# Patient Record
Sex: MALE | Race: WHITE | URBAN - METROPOLITAN AREA
[De-identification: names, ages, dates, MRNs, and addresses within clinical notes are randomized per-mention and may not be internally consistent; named-entity substitution may affect disease eponyms.]

---

## 2022-02-10 ENCOUNTER — APPOINTMENT (RX ONLY)
Dept: URBAN - METROPOLITAN AREA CLINIC 160 | Facility: CLINIC | Age: 52
Setting detail: DERMATOLOGY
End: 2022-02-10

## 2022-02-10 PROBLEM — D48.5 NEOPLASM OF UNCERTAIN BEHAVIOR OF SKIN: Status: ACTIVE | Noted: 2022-02-10

## 2022-02-10 PROCEDURE — ? SCALLOP BIOPSY

## 2022-02-10 PROCEDURE — 11102 TANGNTL BX SKIN SINGLE LES: CPT

## 2022-02-10 PROCEDURE — ? ADDITIONAL NOTES

## 2022-02-10 NOTE — PROCEDURE: SCALLOP BIOPSY
Detail Level: Detailed
Depth Of Biopsy: dermis
Size Of Lesion In Cm (Optional): 0
Biopsy Type: H and E
Biopsy Method: Dermablade
Anesthesia Type: 1% lidocaine with epinephrine
Anesthesia Volume In Cc (Will Not Render If 0): 0.5
Hemostasis: Electrocautery
Wound Care: Petrolatum
Lab: 6
Lab Facility: [id number]
Render Path Notes In Note?: No
Consent: Written consent was obtained and risks were reviewed including but not limited to scarring, infection, bleeding, scabbing, incomplete removal, nerve damage and allergy to anesthesia.
Post-Care Instructions: I reviewed with the patient in detail post-care instructions. Patient is to keep the biopsy site dry overnight, and then apply bacitracin twice daily until healed. Patient may apply hydrogen peroxide soaks to remove any crusting.
Notification Instructions: Patient will be notified of biopsy results. However, patient instructed to call the office if not contacted within 2 weeks.
Billing Type: Third-Party Bill

## 2022-03-01 ENCOUNTER — APPOINTMENT (RX ONLY)
Dept: URBAN - METROPOLITAN AREA CLINIC 160 | Facility: CLINIC | Age: 52
Setting detail: DERMATOLOGY
End: 2022-03-01

## 2022-03-01 PROBLEM — C43.39 MALIGNANT MELANOMA OF OTHER PARTS OF FACE: Status: ACTIVE | Noted: 2022-03-01

## 2022-03-01 PROCEDURE — ? MELANOMA STAGING

## 2022-03-01 PROCEDURE — ? COUNSELING

## 2022-03-01 PROCEDURE — ? PATHOLOGY DISCUSSION

## 2022-03-01 PROCEDURE — ? ADDITIONAL NOTES

## 2022-03-01 PROCEDURE — ? FULL BODY SKIN EXAM - DECLINED

## 2022-03-01 PROCEDURE — 99024 POSTOP FOLLOW-UP VISIT: CPT

## 2022-03-01 PROCEDURE — ? CONSULTATION EXCISION

## 2022-03-01 NOTE — PROCEDURE: CONSULTATION EXCISION
X Size Of Lesion In Cm (Optional): 0
When Should The Patient Follow-Up For Their Next Full-Body Skin Exam?: 3 Months
Anatomic Location From Referring Provider (Optional- Will Override The Chosen Location): left superior lateral malar cheek
Detail Level: Detailed

## 2022-03-01 NOTE — PROCEDURE: MELANOMA STAGING
Detail Level: Detailed
Counseling For Stage Iiid Melanomas: I reviewed the factors which determine melanoma stage. For Stage IIID the 5-year survival rate is around 32%. The 10-year survival is around 24%. I recommended at least monthly skin self-examinations and close dermatology follow-up.
Counseling For Stage Iiib Melanomas: I reviewed the factors which determine melanoma stage. For Stage IIIB the 5-year survival rate is around 59%. The 10-year survival is around 43%. I recommended at least monthly skin self-examinations and close dermatology follow-up.
Counseling For Stage 0 Melanomas: I reviewed the factors which determine melanoma stage. For Stage 0 the 5-year survival rate is 100%. I recommended at least monthly skin self-examinations and close dermatology follow-up.
Distant Metastases: No distant metastases
Counseling For Stage Iic Melanomas: I reviewed the factors which determine melanoma stage. For Stage IIC the 5-year survival rate is around 53%. The 10-year survival is around 40%. I recommended at least monthly skin self-examinations and close dermatology follow-up.
Mitotic Rate: Less than 1/mm2
Counseling For Stage Iib Melanomas: I reviewed the factors which determine melanoma stage. For Stage IIB the 5-year survival rate is around 70%. The 10-year survival is around 57%. I recommended at least monthly skin self-examinations and close dermatology follow-up.
Who Was Counseled?: patient
Counseling For Stage Ib Melanomas: I reviewed the factors which determine melanoma stage. For Stage IB the 5-year survival rate is around 92%. The 10-year survival is around 86%. I recommended at least monthly skin self-examinations and close dermatology follow-up.
Ulceration: No
Counseling For Stage Iv Melanomas: I reviewed the factors which determine melanoma stage. For Stage IV the 5-year survival rate is around 15% to 20%. The 10-year survival is about 10% to 15%. I recommended at least monthly skin self-examinations and close dermatology follow-up.
Counseling For Stage Ia Melanomas: I reviewed the factors which determine melanoma stage. For Stage IA the 5-year survival rate is around 97%. The 10-year survival is around 95%. I recommended at least monthly skin self-examinations and close dermatology follow-up.
Breslow Depth In Mm (Leave At 0 For In Situ): 0.5
Counseling For Stage Iiic Melanomas: I reviewed the factors which determine melanoma stage. For Stage IIIC the 5-year survival rate is around 40%. The 10-year survival is around 24%. I recommended at least monthly skin self-examinations and close dermatology follow-up.
Lymph Node Evaluation (Clinical And Microscopic): No clinically palpable lymph nodes and no microscopic lymph node metastases
Counseling For Stage Iiia Melanomas: I reviewed the factors which determine melanoma stage. For Stage IIIA the 5-year survival rate is around 78%. The 10-year survival is around 68%. I recommended at least monthly skin self-examinations and close dermatology follow-up.
Are Regional Lymph Nodes Available For Evaluation: Yes
Staging Type: initial staging
Counseling For Stage Iia Melanomas: I reviewed the factors which determine melanoma stage. For Stage IIA the 5-year survival rate is around 81%. The 10-year survival is around 67%. I recommended at least monthly skin self-examinations and close dermatology follow-up.

## 2022-03-01 NOTE — PROCEDURE: ADDITIONAL NOTES
Detail Level: Simple
Additional Notes: Patient consent was obtained to proceed with the visit and recommended plan of care after discussion of all risks and benefits, including the risks of COVID-19 exposure
Render Risk Assessment In Note?: yes
Additional Notes: Total price given of $990 as the high end of surgery total
Detail Level: Zone

## 2022-03-01 NOTE — PROCEDURE: PATHOLOGY DISCUSSION
Detail Level: Zone
Add 78345 Cpt? (Important Note: In 2017 The Use Of 41152 Is Being Tracked By Cms To Determine Future Global Period Reimbursement For Global Periods): yes
Quality 265: Biopsy Follow-Up: Biopsy results reviewed, communicated, tracked, and documented

## 2022-03-10 ENCOUNTER — APPOINTMENT (RX ONLY)
Dept: URBAN - METROPOLITAN AREA CLINIC 160 | Facility: CLINIC | Age: 52
Setting detail: DERMATOLOGY
End: 2022-03-10

## 2022-03-10 PROBLEM — C43.39 MALIGNANT MELANOMA OF OTHER PARTS OF FACE: Status: ACTIVE | Noted: 2022-03-10

## 2022-03-10 PROCEDURE — ? EXCISION

## 2022-03-10 PROCEDURE — 13132 CMPLX RPR F/C/C/M/N/AX/G/H/F: CPT

## 2022-03-10 PROCEDURE — 11644 EXC F/E/E/N/L MAL+MRG 3.1-4: CPT

## 2022-03-10 PROCEDURE — ? PRESCRIPTION

## 2022-03-10 RX ORDER — MUPIROCIN 20 MG/G
OINTMENT TOPICAL
Qty: 1 | Refills: 3 | Status: ERX | COMMUNITY
Start: 2022-03-10

## 2022-03-10 RX ADMIN — MUPIROCIN: 20 OINTMENT TOPICAL at 00:00

## 2022-03-10 NOTE — PROCEDURE: EXCISION
Body Location Override (Optional - Billing Will Still Be Based On Selected Body Map Location If Applicable): left superior lateral malar
